# Patient Record
Sex: MALE | URBAN - METROPOLITAN AREA
[De-identification: names, ages, dates, MRNs, and addresses within clinical notes are randomized per-mention and may not be internally consistent; named-entity substitution may affect disease eponyms.]

---

## 2021-01-28 ENCOUNTER — RECORDS - HEALTHEAST (OUTPATIENT)
Dept: LAB | Facility: CLINIC | Age: 51
End: 2021-01-28

## 2021-01-28 LAB
SARS-COV-2 PCR COMMENT: NORMAL
SARS-COV-2 RNA SPEC QL NAA+PROBE: NEGATIVE
SARS-COV-2 VIRUS SPECIMEN SOURCE: NORMAL

## 2021-02-24 ENCOUNTER — RECORDS - HEALTHEAST (OUTPATIENT)
Dept: LAB | Facility: CLINIC | Age: 51
End: 2021-02-24

## 2021-03-24 ENCOUNTER — RECORDS - HEALTHEAST (OUTPATIENT)
Dept: LAB | Facility: CLINIC | Age: 51
End: 2021-03-24

## 2021-03-31 ENCOUNTER — RECORDS - HEALTHEAST (OUTPATIENT)
Dept: LAB | Facility: CLINIC | Age: 51
End: 2021-03-31

## 2021-04-14 ENCOUNTER — RECORDS - HEALTHEAST (OUTPATIENT)
Dept: LAB | Facility: CLINIC | Age: 51
End: 2021-04-14

## 2021-04-28 ENCOUNTER — RECORDS - HEALTHEAST (OUTPATIENT)
Dept: LAB | Facility: CLINIC | Age: 51
End: 2021-04-28

## 2021-05-05 ENCOUNTER — RECORDS - HEALTHEAST (OUTPATIENT)
Dept: LAB | Facility: CLINIC | Age: 51
End: 2021-05-05

## 2021-05-12 ENCOUNTER — RECORDS - HEALTHEAST (OUTPATIENT)
Dept: LAB | Facility: CLINIC | Age: 51
End: 2021-05-12

## 2021-09-09 ENCOUNTER — LAB REQUISITION (OUTPATIENT)
Dept: LAB | Facility: CLINIC | Age: 51
End: 2021-09-09

## 2021-09-09 DIAGNOSIS — U07.1 COVID-19: ICD-10-CM

## 2021-09-09 DIAGNOSIS — Z57.8 OCCUPATIONAL EXPOSURE TO OTHER RISK FACTORS: ICD-10-CM

## 2021-09-09 PROCEDURE — U0005 INFEC AGEN DETEC AMPLI PROBE: HCPCS | Performed by: FAMILY MEDICINE

## 2021-09-09 SDOH — HEALTH STABILITY - PHYSICAL HEALTH: OCCUPATIONAL EXPOSURE TO OTHER RISK FACTORS: Z57.8

## 2021-09-10 LAB — SARS-COV-2 RNA RESP QL NAA+PROBE: NEGATIVE

## 2022-12-10 ENCOUNTER — APPOINTMENT (OUTPATIENT)
Dept: GENERAL RADIOLOGY | Facility: CLINIC | Age: 52
End: 2022-12-10
Attending: FAMILY MEDICINE
Payer: COMMERCIAL

## 2022-12-10 ENCOUNTER — HOSPITAL ENCOUNTER (EMERGENCY)
Facility: CLINIC | Age: 52
Discharge: HOME | End: 2022-12-10
Attending: FAMILY MEDICINE | Admitting: FAMILY MEDICINE
Payer: COMMERCIAL

## 2022-12-10 VITALS
TEMPERATURE: 97.6 F | RESPIRATION RATE: 18 BRPM | HEART RATE: 80 BPM | OXYGEN SATURATION: 99 % | BODY MASS INDEX: 23.86 KG/M2 | DIASTOLIC BLOOD PRESSURE: 88 MMHG | SYSTOLIC BLOOD PRESSURE: 146 MMHG | HEIGHT: 73 IN | WEIGHT: 180 LBS

## 2022-12-10 DIAGNOSIS — S62.646A CLOSED NONDISPLACED FRACTURE OF PROXIMAL PHALANX OF RIGHT LITTLE FINGER, INITIAL ENCOUNTER: ICD-10-CM

## 2022-12-10 PROCEDURE — 99284 EMERGENCY DEPT VISIT MOD MDM: CPT | Mod: 25 | Performed by: FAMILY MEDICINE

## 2022-12-10 PROCEDURE — 26720 TREAT FINGER FRACTURE EACH: CPT | Mod: 54 | Performed by: FAMILY MEDICINE

## 2022-12-10 PROCEDURE — 26720 TREAT FINGER FRACTURE EACH: CPT | Mod: RT | Performed by: FAMILY MEDICINE

## 2022-12-10 PROCEDURE — 73140 X-RAY EXAM OF FINGER(S): CPT | Mod: RT

## 2022-12-10 ASSESSMENT — ACTIVITIES OF DAILY LIVING (ADL): ADLS_ACUITY_SCORE: 33

## 2022-12-11 NOTE — ED PROVIDER NOTES
"  History     Chief Complaint   Patient presents with     Finger     Pain right 5 digit     HPI  Jackson Mckeon is a 52 year old male who has an injury to the right small finger.      He was deer hunting.  Actually was in a sled used for removing deer from the woods.  He was going down a steep hill and caught his little finger on something as it was hanging off the edge of the sled.    He states that the finger was bent out about 45 degrees laterally and he relocated it.  Now having some pain and ecchymosis and stiffness at the PIP joint so would like to have it checked.    This gentleman is otherwise healthy.    Allergies:  No Known Allergies    Problem List:    There are no problems to display for this patient.       Past Medical History:    No past medical history on file.    Past Surgical History:    No past surgical history on file.    Family History:    No family history on file.    Social History:  Marital Status:  Single [1]        Medications:    No current outpatient medications on file.        Review of Systems    No fever.  No breathing problems.  No chest pains.  No abdominal pains.      Physical Exam   BP: (!) 146/88  Pulse: 80  Temp: 97.6  F (36.4  C)  Resp: 18  Height: 185.4 cm (6' 1\")  Weight: 81.6 kg (180 lb)  SpO2: 99 %      Physical Exam    Well-appearing man who looks a bit younger than his age.  HEENT unremarkable.  Neck without obvious mass.  Nonlabored breathing.  Abdomen nondistended.  Right upper extremity -   Mild swelling and tenderness and ecchymosis at the PIP joint of right small finger is noted.  Range of motion appears near normal and alignment appropriate at this time.        ED Course                 Procedures              Critical Care time:  none               Results for orders placed or performed during the hospital encounter of 12/10/22 (from the past 24 hour(s))   XR Finger Right G/E 2 Views    Narrative    EXAM: XR FINGER RIGHT G/E 2 VIEWS  LOCATION: [facility]  DATE/TIME: 12/10/2022 7:47 PM    INDICATION: Small finger fracture.  COMPARISON: None.      Impression    IMPRESSION: Oblique minimally displaced fracture involving the shaft of the right small finger proximal phalanx. No definitive intra-articular extension. Anatomic alignment small finger. Small finger DIP joint osteoarthritis.     NOTE: ABNORMAL REPORT    THE DICTATION ABOVE DESCRIBES AN ABNORMALITY FOR WHICH FOLLOW-UP IS NEEDED.          Medications - No data to display    Assessments & Plan (with Medical Decision Making)     See history above.  Patient had pain in right small finger and actually had some displacement which she relocated at the time of the original injury.  X-ray shows an oblique fracture of the proximal phalanx.  Small finger was taped to the neighboring ring finger.  He was asked to follow-up with orthopedics and he is agreeable with this plan.        I have reviewed the nursing notes.    I have reviewed the findings, diagnosis, plan and need for follow up with the patient.       New Prescriptions    No medications on file       Final diagnoses:   Closed nondisplaced fracture of proximal phalanx of right little finger, initial encounter       12/10/2022   [facility] EMERGENCY DEPT     Valentin Arriola MD  12/10/22 2007

## 2022-12-11 NOTE — DISCHARGE INSTRUCTIONS
Tape the little finger to neighboring ring finger to act as a splint.    Follow-up with orthopedics early next week.  Call [phone].    Ibuprofen for pain.    Ice pack may help.

## 2022-12-11 NOTE — ED TRIAGE NOTES
Out deer hunting today. In [place], extra [place] season. Using a regular sled was going down hill and caught hand on something on the hill. The right 5 digit was 45 about 45 degrees from the knuckle. Finger is discolored and painful. Reports he reduced it himself by pulling straight.

## 2022-12-18 ENCOUNTER — HEALTH MAINTENANCE LETTER (OUTPATIENT)
Age: 52
End: 2022-12-18

## 2022-12-22 ENCOUNTER — TRANSFERRED RECORDS (OUTPATIENT)
Dept: HEALTH INFORMATION MANAGEMENT | Facility: CLINIC | Age: 52
End: 2022-12-22

## 2024-01-28 ENCOUNTER — HEALTH MAINTENANCE LETTER (OUTPATIENT)
Age: 54
End: 2024-01-28

## 2025-02-01 ENCOUNTER — HEALTH MAINTENANCE LETTER (OUTPATIENT)
Age: 55
End: 2025-02-01